# Patient Record
Sex: MALE | Race: WHITE | NOT HISPANIC OR LATINO | Employment: OTHER | URBAN - METROPOLITAN AREA
[De-identification: names, ages, dates, MRNs, and addresses within clinical notes are randomized per-mention and may not be internally consistent; named-entity substitution may affect disease eponyms.]

---

## 2019-06-26 ENCOUNTER — TELEPHONE (OUTPATIENT)
Dept: DERMATOLOGY | Facility: CLINIC | Age: 74
End: 2019-06-26

## 2019-06-26 NOTE — TELEPHONE ENCOUNTER
Pt scheduled with earlier appt tomorrow at 1 pm for rash to leg.   ----- Message from Anatoly Betancourt sent at 6/26/2019  8:11 AM CDT -----  Contact: pt   PT is requesting an earlier appointment due to rash spreading daily on his body       Pt can be reached at  [phone]

## 2019-06-27 ENCOUNTER — OFFICE VISIT (OUTPATIENT)
Dept: DERMATOLOGY | Facility: CLINIC | Age: 74
End: 2019-06-27
Payer: MEDICARE

## 2019-06-27 VITALS — BODY MASS INDEX: 23.06 KG/M2 | RESPIRATION RATE: 18 BRPM | HEIGHT: 75 IN | WEIGHT: 185.44 LBS

## 2019-06-27 DIAGNOSIS — L98.9 DISEASE OF SKIN AND SUBCUTANEOUS TISSUE: Primary | ICD-10-CM

## 2019-06-27 DIAGNOSIS — L81.4 LENTIGINES: ICD-10-CM

## 2019-06-27 DIAGNOSIS — L57.0 AK (ACTINIC KERATOSIS): ICD-10-CM

## 2019-06-27 PROCEDURE — 99202 PR OFFICE/OUTPT VISIT, NEW, LEVL II, 15-29 MIN: ICD-10-PCS | Mod: 25,S$GLB,, | Performed by: DERMATOLOGY

## 2019-06-27 PROCEDURE — 17003 DESTRUCTION, PREMALIGNANT LESIONS; SECOND THROUGH 14 LESIONS: ICD-10-PCS | Mod: S$GLB,,, | Performed by: DERMATOLOGY

## 2019-06-27 PROCEDURE — 17000 DESTRUCT PREMALG LESION: CPT | Mod: S$GLB,,, | Performed by: DERMATOLOGY

## 2019-06-27 PROCEDURE — 99999 PR PBB SHADOW E&M-EST. PATIENT-LVL III: ICD-10-PCS | Mod: PBBFAC,,, | Performed by: DERMATOLOGY

## 2019-06-27 PROCEDURE — 1101F PR PT FALLS ASSESS DOC 0-1 FALLS W/OUT INJ PAST YR: ICD-10-PCS | Mod: CPTII,S$GLB,, | Performed by: DERMATOLOGY

## 2019-06-27 PROCEDURE — 17003 DESTRUCT PREMALG LES 2-14: CPT | Mod: S$GLB,,, | Performed by: DERMATOLOGY

## 2019-06-27 PROCEDURE — 17000 PR DESTRUCTION(LASER SURGERY,CRYOSURGERY,CHEMOSURGERY),PREMALIGNANT LESIONS,FIRST LESION: ICD-10-PCS | Mod: S$GLB,,, | Performed by: DERMATOLOGY

## 2019-06-27 PROCEDURE — 99999 PR PBB SHADOW E&M-EST. PATIENT-LVL III: CPT | Mod: PBBFAC,,, | Performed by: DERMATOLOGY

## 2019-06-27 PROCEDURE — 99202 OFFICE O/P NEW SF 15 MIN: CPT | Mod: 25,S$GLB,, | Performed by: DERMATOLOGY

## 2019-06-27 PROCEDURE — 1101F PT FALLS ASSESS-DOCD LE1/YR: CPT | Mod: CPTII,S$GLB,, | Performed by: DERMATOLOGY

## 2019-06-27 RX ORDER — TRIAMCINOLONE ACETONIDE 1 MG/G
OINTMENT TOPICAL
Qty: 60 G | Refills: 3 | Status: SHIPPED | OUTPATIENT
Start: 2019-06-27 | End: 2020-01-21

## 2019-06-27 NOTE — PROGRESS NOTES
Subjective:       Patient ID:  Chico Perez is a 73 y.o. male who presents for   Chief Complaint   Patient presents with    Rash     Present for initial visit with c/o rash to L leg and R posterior forearm x 2-3 weeks. States that he has had new spots appear since first presenting on leg and will occasionally itch. Tx with OTC hydrocortisone 1% and antiinflammatory cream with minimal improvement.  Referred by PCP Dr. Hazel.    No phx of NMSC  No fhx of melanoma  History of seasonal allergies  Denies history of asthma or eczema.     Past Medical History:  No date: Allergy  No date: Arthritis  No date: BPH (benign prostatic hyperplasia)  No date: Bronchiectasis  No date: Colon polyps      Comment:  once, none on subsequent colonoscopies.  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   HENT: Negative for sore throat.    Respiratory: Negative for cough.    Gastrointestinal: Negative for nausea and vomiting.   Skin: Positive for itching and rash. Negative for dry skin, daily sunscreen use, activity-related sunscreen use and wears hat.   Hematologic/Lymphatic: Does not bruise/bleed easily.        Objective:    Physical Exam   Constitutional: He appears well-developed and well-nourished. No distress.   Neurological: He is alert and oriented to person, place, and time. He is not disoriented.   Psychiatric: He has a normal mood and affect.   Skin:   Areas Examined (abnormalities noted in diagram):   Head / Face Inspection Performed  RUE Inspected  RLE Inspected  LLE Inspection Performed                   Diagram Legend     Erythematous scaling macule/papule c/w actinic keratosis       Vascular papule c/w angioma      Pigmented verrucoid papule/plaque c/w seborrheic keratosis      Yellow umbilicated papule  c/w sebaceous hyperplasia      Irregularly shaped tan macule c/w lentigo     1-2 mm smooth white papules consistent with Milia      Movable subcutaneous cyst with punctum c/w epidermal inclusion cyst      Subcutaneous movable cyst c/w pilar cyst      Firm pink to brown papule c/w dermatofibroma      Pedunculated fleshy papule(s) c/w skin tag(s)      Evenly pigmented macule c/w junctional nevus     Mildly variegated pigmented, slightly irregular-bordered macule c/w mildly atypical nevus      Flesh colored to evenly pigmented papule c/w intradermal nevus       Pink pearly papule/plaque c/w basal cell carcinoma      Erythematous hyperkeratotic cursted plaque c/w SCC      Surgical scar with no sign of skin cancer recurrence      Open and closed comedones      Inflammatory papules and pustules      Verrucoid papule consistent consistent with wart     Erythematous eczematous patches and plaques     Dystrophic onycholytic nail with subungual debris c/w onychomycosis     Umbilicated papule    Erythematous-base heme-crusted tan verrucoid plaque consistent with inflamed seborrheic keratosis     Erythematous Silvery Scaling Plaque c/w Psoriasis     See annotation      Assessment / Plan:        Disease of skin and subcutaneous tissue- resolving eczematous patch, now with dermal hemorrhage vs eczematous PPD. Lateral lesions looks consistent with excoriation with subsequent dermal hemorrhage, lesion on ankle looks consistent with arthropod bite     - I recommended a mild soap and to avoid anti-bacterial soaps which may be too irritating.   - The patient should also use fragrance-free, color-free laundry detergents and avoid dryer sheets which can be irritating.   - The patients skin should be well-moisturized, using a recommended moisturizer multiple times a day as needed.   - I prescribed TMC 0.1% ointment to be applied twice daily to affected areas for two weeks and then tapered to weekends only.   - Side effects of topical  steroids were reviewed with the patient including skin atrophy, striae, telangectasias and tachyphylaxis.   - Discussed if lesion does not start to fade (decrease erythema) in 2 months, recommend RTC for possible punch biopsy.   - Recommend compression hose and leg elevation    AK (actinic keratosis)  Premalignant nature discussed     Cryosurgery Procedure Note    Verbal consent from the patient is obtained including, but not limited to, risk of hypopigmentation/hyperpigmentation, scar, recurrence of lesion. The patient is aware of the precancerous quality and need for treatment of these lesions. Liquid nitrogen cryosurgery is applied to the 2 actinic keratoses, as detailed in the physical exam, to produce a freeze injury. The patient is aware that blisters may form and is instructed on wound care with gentle cleansing and use of vaseline ointment to keep moist until healed. The patient is supplied a handout on cryosurgery and is instructed to call if lesions do not completely resolve.    Lentigenes  - These are benign growths. Reassurance given to patient. No treatment is necessary.   - The signs and symptoms of skin cancer were reviewed and the patient was advised to practice sun protection and sun avoidance, use daily sunscreen, and perform regular self skin exams                 Follow up if symptoms worsen or fail to improve.

## 2020-09-04 PROBLEM — A31.0 ATYPICAL MYCOBACTERIAL INFECTION OF LUNG: Status: ACTIVE | Noted: 2020-09-04

## 2021-01-15 ENCOUNTER — IMMUNIZATION (OUTPATIENT)
Dept: PRIMARY CARE CLINIC | Facility: CLINIC | Age: 76
End: 2021-01-15
Payer: MEDICARE

## 2021-01-15 DIAGNOSIS — Z23 NEED FOR VACCINATION: Primary | ICD-10-CM

## 2021-01-15 PROCEDURE — 91300 COVID-19, MRNA, LNP-S, PF, 30 MCG/0.3 ML DOSE VACCINE: ICD-10-PCS | Mod: S$GLB,,, | Performed by: FAMILY MEDICINE

## 2021-01-15 PROCEDURE — 91300 COVID-19, MRNA, LNP-S, PF, 30 MCG/0.3 ML DOSE VACCINE: CPT | Mod: S$GLB,,, | Performed by: FAMILY MEDICINE

## 2021-01-15 PROCEDURE — 0001A COVID-19, MRNA, LNP-S, PF, 30 MCG/0.3 ML DOSE VACCINE: ICD-10-PCS | Mod: S$GLB,,, | Performed by: FAMILY MEDICINE

## 2021-01-15 PROCEDURE — 0001A COVID-19, MRNA, LNP-S, PF, 30 MCG/0.3 ML DOSE VACCINE: CPT | Mod: S$GLB,,, | Performed by: FAMILY MEDICINE

## 2021-02-05 ENCOUNTER — IMMUNIZATION (OUTPATIENT)
Dept: PRIMARY CARE CLINIC | Facility: CLINIC | Age: 76
End: 2021-02-05
Payer: MEDICARE

## 2021-02-05 DIAGNOSIS — Z23 NEED FOR VACCINATION: Primary | ICD-10-CM

## 2021-02-05 PROCEDURE — 91300 COVID-19, MRNA, LNP-S, PF, 30 MCG/0.3 ML DOSE VACCINE: CPT | Mod: S$GLB,,, | Performed by: FAMILY MEDICINE

## 2021-02-05 PROCEDURE — 0002A COVID-19, MRNA, LNP-S, PF, 30 MCG/0.3 ML DOSE VACCINE: CPT | Mod: S$GLB,,, | Performed by: FAMILY MEDICINE

## 2021-02-05 PROCEDURE — 0002A COVID-19, MRNA, LNP-S, PF, 30 MCG/0.3 ML DOSE VACCINE: ICD-10-PCS | Mod: S$GLB,,, | Performed by: FAMILY MEDICINE

## 2021-02-05 PROCEDURE — 91300 COVID-19, MRNA, LNP-S, PF, 30 MCG/0.3 ML DOSE VACCINE: ICD-10-PCS | Mod: S$GLB,,, | Performed by: FAMILY MEDICINE

## 2021-12-21 PROBLEM — J44.9 CHRONIC OBSTRUCTIVE PULMONARY DISEASE, UNSPECIFIED COPD TYPE: Status: ACTIVE | Noted: 2021-12-21

## 2023-01-10 ENCOUNTER — TELEPHONE (OUTPATIENT)
Dept: ORTHOPEDICS | Facility: CLINIC | Age: 78
End: 2023-01-10
Payer: MEDICARE

## 2023-01-30 ENCOUNTER — OFFICE VISIT (OUTPATIENT)
Dept: ORTHOPEDICS | Facility: CLINIC | Age: 78
End: 2023-01-30
Payer: MEDICARE

## 2023-01-30 DIAGNOSIS — M65.341 TRIGGER FINGER, RIGHT RING FINGER: Primary | ICD-10-CM

## 2023-01-30 DIAGNOSIS — M65.341 TRIGGER RING FINGER OF RIGHT HAND: ICD-10-CM

## 2023-01-30 PROCEDURE — 99999 PR PBB SHADOW E&M-EST. PATIENT-LVL III: ICD-10-PCS | Mod: PBBFAC,,, | Performed by: ORTHOPAEDIC SURGERY

## 2023-01-30 PROCEDURE — 3288F PR FALLS RISK ASSESSMENT DOCUMENTED: ICD-10-PCS | Mod: CPTII,S$GLB,, | Performed by: ORTHOPAEDIC SURGERY

## 2023-01-30 PROCEDURE — 1159F PR MEDICATION LIST DOCUMENTED IN MEDICAL RECORD: ICD-10-PCS | Mod: CPTII,S$GLB,, | Performed by: ORTHOPAEDIC SURGERY

## 2023-01-30 PROCEDURE — 99203 PR OFFICE/OUTPT VISIT, NEW, LEVL III, 30-44 MIN: ICD-10-PCS | Mod: 25,S$GLB,, | Performed by: ORTHOPAEDIC SURGERY

## 2023-01-30 PROCEDURE — 1159F MED LIST DOCD IN RCRD: CPT | Mod: CPTII,S$GLB,, | Performed by: ORTHOPAEDIC SURGERY

## 2023-01-30 PROCEDURE — 1126F PR PAIN SEVERITY QUANTIFIED, NO PAIN PRESENT: ICD-10-PCS | Mod: CPTII,S$GLB,, | Performed by: ORTHOPAEDIC SURGERY

## 2023-01-30 PROCEDURE — 99999 PR PBB SHADOW E&M-EST. PATIENT-LVL III: CPT | Mod: PBBFAC,,, | Performed by: ORTHOPAEDIC SURGERY

## 2023-01-30 PROCEDURE — 20550 TENDON SHEATH: ICD-10-PCS | Mod: RT,S$GLB,, | Performed by: ORTHOPAEDIC SURGERY

## 2023-01-30 PROCEDURE — 1157F PR ADVANCE CARE PLAN OR EQUIV PRESENT IN MEDICAL RECORD: ICD-10-PCS | Mod: CPTII,S$GLB,, | Performed by: ORTHOPAEDIC SURGERY

## 2023-01-30 PROCEDURE — 99203 OFFICE O/P NEW LOW 30 MIN: CPT | Mod: 25,S$GLB,, | Performed by: ORTHOPAEDIC SURGERY

## 2023-01-30 PROCEDURE — 1101F PT FALLS ASSESS-DOCD LE1/YR: CPT | Mod: CPTII,S$GLB,, | Performed by: ORTHOPAEDIC SURGERY

## 2023-01-30 PROCEDURE — 20550 NJX 1 TENDON SHEATH/LIGAMENT: CPT | Mod: RT,S$GLB,, | Performed by: ORTHOPAEDIC SURGERY

## 2023-01-30 PROCEDURE — 1101F PR PT FALLS ASSESS DOC 0-1 FALLS W/OUT INJ PAST YR: ICD-10-PCS | Mod: CPTII,S$GLB,, | Performed by: ORTHOPAEDIC SURGERY

## 2023-01-30 PROCEDURE — 3288F FALL RISK ASSESSMENT DOCD: CPT | Mod: CPTII,S$GLB,, | Performed by: ORTHOPAEDIC SURGERY

## 2023-01-30 PROCEDURE — 1126F AMNT PAIN NOTED NONE PRSNT: CPT | Mod: CPTII,S$GLB,, | Performed by: ORTHOPAEDIC SURGERY

## 2023-01-30 PROCEDURE — 1157F ADVNC CARE PLAN IN RCRD: CPT | Mod: CPTII,S$GLB,, | Performed by: ORTHOPAEDIC SURGERY

## 2023-01-30 RX ORDER — TRIAMCINOLONE ACETONIDE 40 MG/ML
40 INJECTION, SUSPENSION INTRA-ARTICULAR; INTRAMUSCULAR
Status: DISCONTINUED | OUTPATIENT
Start: 2023-01-30 | End: 2023-01-30 | Stop reason: HOSPADM

## 2023-01-30 RX ADMIN — TRIAMCINOLONE ACETONIDE 40 MG: 40 INJECTION, SUSPENSION INTRA-ARTICULAR; INTRAMUSCULAR at 02:01

## 2023-01-30 NOTE — PROGRESS NOTES
2023    Chief Complaint:  Chief Complaint   Patient presents with    Right Hand - Pain     Ring finger trigger finger       HPI:  Chico Perez is a 77 y.o. male, who presents to clinic today has a history of catching and triggering of his right ring finger.  He states it has been going on for couple of months.  States that he did not have any injuries.  He has not had any treatment up to this point.  He has no other complaints.    PMHX:  Past Medical History:   Diagnosis Date    Allergy     Arthritis     BPH (benign prostatic hyperplasia)     Bronchiectasis     Colon polyps     once, none on subsequent colonoscopies.    Gout     HTN (hypertension)     Hyperlipidemia     Hypothyroid     JOE treated with BiPAP     Dr Feliciano PA (sleep medicine); Rx Ambien 5 mg 1/2 tablet qHS per Dr Zhang. titrates biPAP    Pulmonary Mycobacterium avium complex (MAC) infection     sees MAC specialist Dr Annette Eli in [facility]. last AFB 10/14/19       PSHX:  Past Surgical History:   Procedure Laterality Date    BACK SURGERY      COLONOSCOPY W/ BIOPSIES      NASAL SINUS SURGERY      PERIPHERALLY INSERTED CENTRAL CATHETER INSERTION N/A 2020    Procedure: INSERTION, PICC;  Surgeon: PICC, STPH;  Location: [facility] ENDO;  Service: Cardiology;  Laterality: N/A;  Dr Lopez       FMHX:  Family History   Problem Relation Age of Onset    Heart disease Mother     Hyperlipidemia Mother     Stroke Father     Lung cancer Sister     No Known Problems Daughter        SOCHX:  Social History     Tobacco Use    Smoking status: Former     Packs/day: 2.00     Years: 8.00     Pack years: 16.00     Types: Cigarettes     Quit date:      Years since quittin.1    Smokeless tobacco: Never   Substance Use Topics    Alcohol use: Yes     Alcohol/week: 0.0 - 2.0 standard drinks       ALLERGIES:  Patient has no known allergies.    CURRENT MEDICATIONS:  Current Outpatient Medications on File Prior to Visit   Medication Sig Dispense Refill     alfuzosin (UROXATRAL) 10 mg Tb24 Take 1 tablet (10 mg total) by mouth daily with breakfast. 90 tablet 3    amLODIPine (NORVASC) 5 MG tablet Take 1 tablet (5 mg total) by mouth once daily. 90 tablet 3    azelastine-fluticasone (DYMISTA) 137-50 mcg/spray Spry nassal spray USE 1 SPRAY IN EACH NOSTRILTWICE DAILY 69 g 3    cetirizine (ZYRTEC) 10 MG tablet Take 10 mg by mouth once daily.      donepeziL (ARICEPT) 10 MG tablet Take 1 tablet (10 mg total) by mouth every evening. 90 tablet 3    eszopiclone (LUNESTA) 3 mg Tab Take 3 mg by mouth every evening.      hydroCHLOROthiazide (MICROZIDE) 12.5 mg capsule Take 1 capsule (12.5 mg total) by mouth every morning. 90 capsule 3    levothyroxine (SYNTHROID) 75 MCG tablet Take 1 tablet (75 mcg total) by mouth once daily. 90 tablet 3    magnesium 250 mg Tab Take 1 tablet by mouth once daily.      meloxicam (MOBIC) 15 MG tablet Take 1 tablet (15 mg total) by mouth once daily. 90 tablet 0    memantine (NAMENDA) 5 MG Tab Take 1 tablet (5 mg total) by mouth once daily for 7 days, THEN 1 tablet (5 mg total) 2 (two) times daily for 7 days, THEN 2 tablets (10 mg total) 2 (two) times daily for 14 days. 77 tablet 0    multivitamin (THERAGRAN) per tablet Take 1 tablet by mouth once daily.      rosuvastatin (CRESTOR) 20 MG tablet Take 1 tablet (20 mg total) by mouth once daily. 90 tablet 3    sertraline (ZOLOFT) 100 MG tablet Take 1 tablet (100 mg total) by mouth once daily. 90 tablet 3     No current facility-administered medications on file prior to visit.       REVIEW OF SYSTEMS:  Review of Systems   Constitutional: Negative.    HENT: Negative.     Eyes: Negative.    Respiratory: Negative.     Cardiovascular: Negative.    Gastrointestinal: Negative.    Genitourinary: Negative.    Musculoskeletal:  Positive for joint pain.   Skin: Negative.    Neurological: Negative.    Endo/Heme/Allergies: Negative.    Psychiatric/Behavioral: Negative.       GENERAL PHYSICAL EXAM:   There were no vitals  taken for this visit.   GEN: well developed, well nourished, no acute distress   HENT: Normocephalic, atraumatic   EYES: No discharge, conjunctiva normal   NECK: Supple, non-tender   PULM: No wheezing, no respiratory distress   CV: RRR   ABD: Soft, non-tender    ORTHO EXAM:   Examination the right hand and ring finger reveals that there is no edema.  There are no skin changes.  Palpation produces only mild tenderness over the A1 pulley of the ring finger.  Flexion and extension reveals mild triggering.  Does have sensation intact in the median radial ulnar distribution capillary refill is less than 2 seconds in the digits     RADIOLOGY:   None    ASSESSMENT:   Right ring finger triggering    PLAN:  1. I have discussed treatment with the patient.  I have discussed the possibility of steroid injection versus continued range of motion activities.  After discussion of the risks and benefits the patient has confirmed consent for steroid injection    2.  A steroid injection was placed to the right ring finger flexor tendon sheath    3.  Will follow up with me on a p.r.n. basis

## 2023-01-30 NOTE — PROCEDURES
Tendon Sheath    Date/Time: 1/30/2023 2:00 PM  Performed by: Ruben Garcia MD  Authorized by: Ruben Garcia MD     Consent Done?:  Yes (Verbal)  Indications:  Pain  Site marked: the procedure site was marked    Timeout: prior to procedure the correct patient, procedure, and site was verified    Prep: patient was prepped and draped in usual sterile fashion      Local anesthetic:  Lidocaine 1% without epinephrine  Anesthetic total (ml):  0.5    Location:  Ring finger  Site:  R ring flexor tendon sheath  Needle size:  25 G  Medications:  40 mg triamcinolone acetonide 40 mg/mL (20mg injected)  Patient tolerance:  Patient tolerated the procedure well with no immediate complications

## 2023-08-07 PROBLEM — Z29.89 ALTITUDE SICKNESS PROPHYLAXIS: Status: ACTIVE | Noted: 2023-08-07

## 2023-09-08 ENCOUNTER — OFFICE VISIT (OUTPATIENT)
Dept: PAIN MEDICINE | Facility: CLINIC | Age: 78
End: 2023-09-08
Payer: MEDICARE

## 2023-09-08 VITALS
SYSTOLIC BLOOD PRESSURE: 174 MMHG | HEART RATE: 53 BPM | BODY MASS INDEX: 24.74 KG/M2 | WEIGHT: 198 LBS | DIASTOLIC BLOOD PRESSURE: 84 MMHG

## 2023-09-08 DIAGNOSIS — G89.29 CHRONIC BILATERAL LOW BACK PAIN WITHOUT SCIATICA: ICD-10-CM

## 2023-09-08 DIAGNOSIS — M54.50 CHRONIC BILATERAL LOW BACK PAIN WITHOUT SCIATICA: ICD-10-CM

## 2023-09-08 DIAGNOSIS — M54.16 LUMBAR RADICULOPATHY, CHRONIC: Primary | ICD-10-CM

## 2023-09-08 DIAGNOSIS — M53.3 SACRAL BACK PAIN: ICD-10-CM

## 2023-09-08 PROCEDURE — 1159F MED LIST DOCD IN RCRD: CPT | Mod: CPTII,S$GLB,, | Performed by: ANESTHESIOLOGY

## 2023-09-08 PROCEDURE — 99999 PR PBB SHADOW E&M-EST. PATIENT-LVL III: ICD-10-PCS | Mod: PBBFAC,,, | Performed by: ANESTHESIOLOGY

## 2023-09-08 PROCEDURE — 3077F SYST BP >= 140 MM HG: CPT | Mod: CPTII,S$GLB,, | Performed by: ANESTHESIOLOGY

## 2023-09-08 PROCEDURE — 1125F AMNT PAIN NOTED PAIN PRSNT: CPT | Mod: CPTII,S$GLB,, | Performed by: ANESTHESIOLOGY

## 2023-09-08 PROCEDURE — 1157F PR ADVANCE CARE PLAN OR EQUIV PRESENT IN MEDICAL RECORD: ICD-10-PCS | Mod: CPTII,S$GLB,, | Performed by: ANESTHESIOLOGY

## 2023-09-08 PROCEDURE — 1159F PR MEDICATION LIST DOCUMENTED IN MEDICAL RECORD: ICD-10-PCS | Mod: CPTII,S$GLB,, | Performed by: ANESTHESIOLOGY

## 2023-09-08 PROCEDURE — 3288F PR FALLS RISK ASSESSMENT DOCUMENTED: ICD-10-PCS | Mod: CPTII,S$GLB,, | Performed by: ANESTHESIOLOGY

## 2023-09-08 PROCEDURE — 3079F DIAST BP 80-89 MM HG: CPT | Mod: CPTII,S$GLB,, | Performed by: ANESTHESIOLOGY

## 2023-09-08 PROCEDURE — 3077F PR MOST RECENT SYSTOLIC BLOOD PRESSURE >= 140 MM HG: ICD-10-PCS | Mod: CPTII,S$GLB,, | Performed by: ANESTHESIOLOGY

## 2023-09-08 PROCEDURE — 99204 OFFICE O/P NEW MOD 45 MIN: CPT | Mod: S$GLB,,, | Performed by: ANESTHESIOLOGY

## 2023-09-08 PROCEDURE — 99204 PR OFFICE/OUTPT VISIT, NEW, LEVL IV, 45-59 MIN: ICD-10-PCS | Mod: S$GLB,,, | Performed by: ANESTHESIOLOGY

## 2023-09-08 PROCEDURE — 1157F ADVNC CARE PLAN IN RCRD: CPT | Mod: CPTII,S$GLB,, | Performed by: ANESTHESIOLOGY

## 2023-09-08 PROCEDURE — 1101F PT FALLS ASSESS-DOCD LE1/YR: CPT | Mod: CPTII,S$GLB,, | Performed by: ANESTHESIOLOGY

## 2023-09-08 PROCEDURE — 3079F PR MOST RECENT DIASTOLIC BLOOD PRESSURE 80-89 MM HG: ICD-10-PCS | Mod: CPTII,S$GLB,, | Performed by: ANESTHESIOLOGY

## 2023-09-08 PROCEDURE — 1125F PR PAIN SEVERITY QUANTIFIED, PAIN PRESENT: ICD-10-PCS | Mod: CPTII,S$GLB,, | Performed by: ANESTHESIOLOGY

## 2023-09-08 PROCEDURE — 1101F PR PT FALLS ASSESS DOC 0-1 FALLS W/OUT INJ PAST YR: ICD-10-PCS | Mod: CPTII,S$GLB,, | Performed by: ANESTHESIOLOGY

## 2023-09-08 PROCEDURE — 1160F PR REVIEW ALL MEDS BY PRESCRIBER/CLIN PHARMACIST DOCUMENTED: ICD-10-PCS | Mod: CPTII,S$GLB,, | Performed by: ANESTHESIOLOGY

## 2023-09-08 PROCEDURE — 1160F RVW MEDS BY RX/DR IN RCRD: CPT | Mod: CPTII,S$GLB,, | Performed by: ANESTHESIOLOGY

## 2023-09-08 PROCEDURE — 3288F FALL RISK ASSESSMENT DOCD: CPT | Mod: CPTII,S$GLB,, | Performed by: ANESTHESIOLOGY

## 2023-09-08 PROCEDURE — 99999 PR PBB SHADOW E&M-EST. PATIENT-LVL III: CPT | Mod: PBBFAC,,, | Performed by: ANESTHESIOLOGY

## 2023-09-08 NOTE — PROGRESS NOTES
"  This note was completed with dictation software and grammatical errors may exist.    Chief Complaint   Patient presents with    Low-back Pain    Lumbar Spine Pain (L-Spine)        HPI: Chico Perez is a 78 y.o. year old male patient who has a past medical history of Allergy, Arthritis, BPH (benign prostatic hyperplasia), Bronchiectasis, Colon polyps, Gout, HTN (hypertension), Hyperlipidemia, Hypothyroid, JOE treated with BiPAP, and Pulmonary Mycobacterium avium complex (MAC) infection. He presents in referral from Dr. Maria C Hazel for back pain.  The patient reports having history of multiple back surgeries with pain radiating in his hips, weakness and has done well with each surgery.  His last surgery was in 2018 and had been doing well until about 6 months ago.  He states that he has been having pain in the sacrum but not in the coccyx.  He states that is worse with sitting in hard chairs, stooping bending forward.  He describes the pain as dull.  Is worse with long sitting, it does improve once he stands up and starts walking around.      Pain intervention history: None    Spine surgeries:  Lumbar surgery 1991, 2005, 2018    Antineuropathics:  NSAIDs:  Mobic 15  Physical therapy:  Antidepressants:  Zoloft 100  Muscle relaxers:  Zanaflex 4  Opioids:  Antiplatelets/Anticoagulants:        ROS:  He reports back pain, memory loss.  Balance of review of systems is negative.    No results found for: "LABA1C", "HGBA1C"    Lab Results   Component Value Date    WBC 6.59 07/19/2023    HGB 14.6 07/19/2023    HCT 43.8 07/19/2023    MCV 97 07/19/2023     07/19/2023             Past Medical History:   Diagnosis Date    Allergy     Arthritis     BPH (benign prostatic hyperplasia)     Bronchiectasis     Colon polyps     once, none on subsequent colonoscopies.    Gout     HTN (hypertension)     Hyperlipidemia     Hypothyroid     JOE treated with BiPAP     Dr Feliciano PA (sleep medicine); Rx Ambien 5 mg 1/2 tablet qHS " per Dr Zhang. titrates biPAP    Pulmonary Mycobacterium avium complex (MAC) infection     sees MAC specialist Dr Annette Eli in [facility]. last AFB 10/14/19       Past Surgical History:   Procedure Laterality Date    BACK SURGERY  2017    COLONOSCOPY W/ BIOPSIES      NASAL SINUS SURGERY      PERIPHERALLY INSERTED CENTRAL CATHETER INSERTION N/A 2020    Procedure: INSERTION, PICC;  Surgeon: PICC, STPH;  Location: [facility] ENDO;  Service: Cardiology;  Laterality: N/A;  Dr Lopez       Social History     Socioeconomic History    Marital status:    Occupational History    Occupation: retired, used to work for [employer];    Tobacco Use    Smoking status: Former     Current packs/day: 0.00     Average packs/day: 2.0 packs/day for 8.0 years (16.0 ttl pk-yrs)     Types: Cigarettes     Start date:      Quit date:      Years since quittin.7    Smokeless tobacco: Never   Substance and Sexual Activity    Alcohol use: Yes     Alcohol/week: 0.0 - 2.0 standard drinks of alcohol    Drug use: Never    Sexual activity: Yes     Partners: Female     Social Determinants of Health     Financial Resource Strain: Low Risk  (2022)    Overall Financial Resource Strain (CARDIA)     Difficulty of Paying Living Expenses: Not hard at all   Food Insecurity: No Food Insecurity (2022)    Hunger Vital Sign     Worried About Running Out of Food in the Last Year: Never true     Ran Out of Food in the Last Year: Never true   Transportation Needs: No Transportation Needs (2022)    PRAPARE - Transportation     Lack of Transportation (Medical): No     Lack of Transportation (Non-Medical): No   Physical Activity: Insufficiently Active (2022)    Exercise Vital Sign     Days of Exercise per Week: 3 days     Minutes of Exercise per Session: 30 min   Stress: No Stress Concern Present (2022)    [employer] [place] [employer] - Occupational Stress Questionnaire     Feeling of Stress : Not at  all   Social Connections: Unknown (4/19/2022)    Social Connection and Isolation Panel [NHANES]     Frequency of Communication with Friends and Family: Once a week     Frequency of Social Gatherings with Friends and Family: Patient refused     Active Member of Clubs or Organizations: No     Attends Club or Organization Meetings: Never     Marital Status:    Housing Stability: Low Risk  (4/19/2022)    Housing Stability Vital Sign     Unable to Pay for Housing in the Last Year: No     Number of Places Lived in the Last Year: 1     Unstable Housing in the Last Year: No         Medications/Allergies: See med card    Vitals:    09/08/23 0954   BP: (!) 174/84   Pulse: (!) 53   Weight: 89.8 kg (197 lb 15.6 oz)   PainSc:   2   PainLoc: Back     Body mass index is 24.74 kg/m².    Physical exam:  Gen: A and O x3, pleasant, well-groomed  Skin: No rashes or obvious lesions  HEENT: PERRLA, no obvious deformities on ears or in canals. Trachea midline.  CVS: Regular rate and rhythm, normal palpable pulses.  Resp:No increased work of breathing, symmetrical chest rise.  Abdomen: Soft, NT/ND.  Musculoskeletal: Able to heel walk, toe walk. No antalgic gait.           Neuro:    Iliopsoas Quadriceps Knee  Flexion Tibialis  anterior Gastro- cnemius EHL   Lower: R 5/5 5/5 5/5 5/5 5/5 5/5    L 5/5 5/5 5/5 5/5 5/5 5/5      Left  Right    Patellar DTR 1+ 1+   Achilles DTR 0+ 0+   Barnes Absent  Absent   Clonus Absent Absent   Babinski Absent Absent     Intact and symmetrical to light touch and pinprick in L1-S1 dermatomes bilaterally.    Lumbar spine:  Lumbar spine: ROM is full with flexion extension and oblique extension with slight increased pain on extension.  Gordon's test causes no increased pain on either side.    Supine straight leg raise is negative bilaterally.    Internal and external rotation of the hip causes no increased pain on either side.  Myofascial exam: No tenderness to palpation across lumbar paraspinous muscles.   No tenderness with palpation over the sacrum or the coccyx.    Imagin23 Xray L-spine:  There is normal lumbar lordosis.  Multilevel degenerative disc disease throughout the lumbar spine with disc space narrowing especially at the L3-4 L4-5 and L5-S1 disc spaces.  There is no spondylolisthesis or spondylolysis.  Marginal anterior spondylotic osteophytes throughout the lumbar spine.  Vertebral body heights are maintained.  No acute fractures or osteoblastic or lytic lesions.  The spinous process of L3 not visualized, likely related to prior decompressive laminectomies.  Clinical correlation for this is suggested.  Bilateral SI joint arthropathy.  Paraspinal soft tissues are unremarkable.   Calcific atherosclerosis of the abdominal aorta and the iliac arteries.    Assessment:  Chico Perez is a 78 y.o. year old male patient who has a past medical history of Allergy, Arthritis, BPH (benign prostatic hyperplasia), Bronchiectasis, Colon polyps, Gout, HTN (hypertension), Hyperlipidemia, Hypothyroid, JOE treated with BiPAP, and Pulmonary Mycobacterium avium complex (MAC) infection. He presents in referral from Dr. Maria C Hazel for back pain.   1. Lumbar radiculopathy, chronic  MRI Lumbar Spine W WO Cont    Creatinine, serum      2. Sacral back pain  Ambulatory referral/consult to Pain Clinic    MRI Lumbar Spine W WO Cont    Creatinine, serum      3. Chronic bilateral low back pain without sciatica  Ambulatory referral/consult to Pain Clinic    MRI Lumbar Spine W WO Cont    Creatinine, serum          Plan:  1. We discussed that it is unclear what is causing his pain, does not seem to be coccydynia, we do not have any imaging other than x-ray.  I do suspect that he may have vertebrogenic pain or stenosis.  I am going to get an MRI of his lumbar spine with and without contrast and call him with the results and we can discuss management.      Thank you for referring this interesting patient, and I look forward to  continuing to collaborate in his care.

## 2023-09-22 ENCOUNTER — HOSPITAL ENCOUNTER (OUTPATIENT)
Dept: RADIOLOGY | Facility: HOSPITAL | Age: 78
Discharge: HOME | End: 2023-09-22
Attending: ANESTHESIOLOGY
Payer: MEDICARE

## 2023-09-22 DIAGNOSIS — M53.3 SACRAL BACK PAIN: ICD-10-CM

## 2023-09-22 DIAGNOSIS — M54.16 LUMBAR RADICULOPATHY, CHRONIC: ICD-10-CM

## 2023-09-22 DIAGNOSIS — G89.29 CHRONIC BILATERAL LOW BACK PAIN WITHOUT SCIATICA: ICD-10-CM

## 2023-09-22 DIAGNOSIS — M54.50 CHRONIC BILATERAL LOW BACK PAIN WITHOUT SCIATICA: ICD-10-CM

## 2023-09-22 PROCEDURE — 72158 MRI LUMBAR SPINE W/O & W/DYE: CPT | Mod: 26,,, | Performed by: RADIOLOGY

## 2023-09-22 PROCEDURE — A9585 GADOBUTROL INJECTION: HCPCS | Mod: PO | Performed by: ANESTHESIOLOGY

## 2023-09-22 PROCEDURE — 72158 MRI LUMBAR SPINE W/O & W/DYE: CPT | Mod: TC,PO

## 2023-09-22 PROCEDURE — 25500020 PHARM REV CODE 255: Mod: PO | Performed by: ANESTHESIOLOGY

## 2023-09-22 PROCEDURE — 72158 MRI LUMBAR SPINE W WO CONTRAST: ICD-10-PCS | Mod: 26,,, | Performed by: RADIOLOGY

## 2023-09-22 RX ORDER — GADOBUTROL 604.72 MG/ML
8 INJECTION INTRAVENOUS
Status: COMPLETED | OUTPATIENT
Start: 2023-09-22 | End: 2023-09-22

## 2023-09-22 RX ADMIN — GADOBUTROL 8 ML: 604.72 INJECTION INTRAVENOUS at 09:09

## 2023-09-27 ENCOUNTER — TELEPHONE (OUTPATIENT)
Dept: PAIN MEDICINE | Facility: HOSPITAL | Age: 78
End: 2023-09-27

## 2023-09-27 NOTE — TELEPHONE ENCOUNTER
Please let the patient know that I have reviewed his MRI and it mainly shows arthritis, no narrowing of the spinal canal or pinching of the nerves.  I recommend that we could try a procedure that would numb the joint in his low back to tell us if the pain is coming from those joints.  I have placed the orders below.      Physician - Dr Maynard    Type of Procedure/Injection - Lumbar Medial Branch Block  L4/5 and L5/S1           Laterality - Bilateral      Type of Sedation - RNIV      Need to hold medication - No      N/A          Clearance needed - No      Follow up - phone call next day

## 2023-09-28 ENCOUNTER — PATIENT MESSAGE (OUTPATIENT)
Dept: PAIN MEDICINE | Facility: CLINIC | Age: 78
End: 2023-09-28
Payer: MEDICARE

## 2023-09-28 DIAGNOSIS — M47.816 LUMBAR SPONDYLOSIS: Primary | ICD-10-CM

## 2023-09-28 RX ORDER — SODIUM CHLORIDE, SODIUM LACTATE, POTASSIUM CHLORIDE, CALCIUM CHLORIDE 600; 310; 30; 20 MG/100ML; MG/100ML; MG/100ML; MG/100ML
INJECTION, SOLUTION INTRAVENOUS CONTINUOUS
Status: CANCELLED | OUTPATIENT
Start: 2023-09-28

## 2023-09-28 NOTE — TELEPHONE ENCOUNTER
Spoke with patient. Scheduled for lumbar MBB with Dr. Maynard. Laurynt message sent with pre op information.

## 2023-10-13 ENCOUNTER — HOSPITAL ENCOUNTER (OUTPATIENT)
Facility: HOSPITAL | Age: 78
Discharge: HOME | End: 2023-10-13
Attending: ANESTHESIOLOGY | Admitting: ANESTHESIOLOGY
Payer: MEDICARE

## 2023-10-13 ENCOUNTER — HOSPITAL ENCOUNTER (OUTPATIENT)
Dept: RADIOLOGY | Facility: HOSPITAL | Age: 78
Discharge: HOME | End: 2023-10-13
Attending: ANESTHESIOLOGY | Admitting: ANESTHESIOLOGY
Payer: MEDICARE

## 2023-10-13 VITALS
DIASTOLIC BLOOD PRESSURE: 88 MMHG | WEIGHT: 200 LBS | TEMPERATURE: 98 F | OXYGEN SATURATION: 98 % | HEIGHT: 75 IN | RESPIRATION RATE: 14 BRPM | BODY MASS INDEX: 24.87 KG/M2 | HEART RATE: 60 BPM | SYSTOLIC BLOOD PRESSURE: 184 MMHG

## 2023-10-13 DIAGNOSIS — M47.816 LUMBAR SPONDYLOSIS: ICD-10-CM

## 2023-10-13 DIAGNOSIS — M54.50 LOWER BACK PAIN: ICD-10-CM

## 2023-10-13 PROCEDURE — 64493 PR INJ DX/THER AGNT PARAVERT FACET JOINT,IMG GUIDE,LUMBAR/SAC,1ST LVL: ICD-10-PCS | Mod: 50,,, | Performed by: ANESTHESIOLOGY

## 2023-10-13 PROCEDURE — 76000 FLUOROSCOPY <1 HR PHYS/QHP: CPT | Mod: TC,PO

## 2023-10-13 PROCEDURE — 64494 PR INJ DX/THER AGNT PARAVERT FACET JOINT,IMG GUIDE,LUMBAR/SAC, 2ND LEVEL: ICD-10-PCS | Mod: 50,,, | Performed by: ANESTHESIOLOGY

## 2023-10-13 PROCEDURE — 25000003 PHARM REV CODE 250: Mod: PO | Performed by: ANESTHESIOLOGY

## 2023-10-13 PROCEDURE — 64493 INJ PARAVERT F JNT L/S 1 LEV: CPT | Mod: 50,,, | Performed by: ANESTHESIOLOGY

## 2023-10-13 PROCEDURE — 64494 INJ PARAVERT F JNT L/S 2 LEV: CPT | Mod: 50,,, | Performed by: ANESTHESIOLOGY

## 2023-10-13 PROCEDURE — 64493 INJ PARAVERT F JNT L/S 1 LEV: CPT | Mod: 50,PO | Performed by: ANESTHESIOLOGY

## 2023-10-13 PROCEDURE — 64494 INJ PARAVERT F JNT L/S 2 LEV: CPT | Mod: 50,PO | Performed by: ANESTHESIOLOGY

## 2023-10-13 PROCEDURE — 63600175 PHARM REV CODE 636 W HCPCS: Mod: PO | Performed by: ANESTHESIOLOGY

## 2023-10-13 RX ORDER — SODIUM CHLORIDE, SODIUM LACTATE, POTASSIUM CHLORIDE, CALCIUM CHLORIDE 600; 310; 30; 20 MG/100ML; MG/100ML; MG/100ML; MG/100ML
INJECTION, SOLUTION INTRAVENOUS CONTINUOUS
Status: DISCONTINUED | OUTPATIENT
Start: 2023-10-13 | End: 2023-10-13 | Stop reason: HOSPADM

## 2023-10-13 RX ORDER — LIDOCAINE HYDROCHLORIDE 10 MG/ML
INJECTION, SOLUTION EPIDURAL; INFILTRATION; INTRACAUDAL; PERINEURAL
Status: DISCONTINUED | OUTPATIENT
Start: 2023-10-13 | End: 2023-10-13 | Stop reason: HOSPADM

## 2023-10-13 RX ORDER — MIDAZOLAM HYDROCHLORIDE 2 MG/2ML
INJECTION, SOLUTION INTRAMUSCULAR; INTRAVENOUS
Status: DISCONTINUED | OUTPATIENT
Start: 2023-10-13 | End: 2023-10-13 | Stop reason: HOSPADM

## 2023-10-13 RX ORDER — BUPIVACAINE HYDROCHLORIDE 5 MG/ML
INJECTION, SOLUTION EPIDURAL; INTRACAUDAL
Status: DISCONTINUED | OUTPATIENT
Start: 2023-10-13 | End: 2023-10-13 | Stop reason: HOSPADM

## 2023-10-13 RX ADMIN — SODIUM CHLORIDE, POTASSIUM CHLORIDE, SODIUM LACTATE AND CALCIUM CHLORIDE: 600; 310; 30; 20 INJECTION, SOLUTION INTRAVENOUS at 12:10

## 2023-10-13 NOTE — H&P
CC: Back pain    HPI: The patient is a 79yo man with a history of lumbar spondylosis here for bilateral L4/5 and L5/S1 medial branch block. There are no major changes in history and physical from 23.    Past Medical History:   Diagnosis Date    Allergy     Arthritis     BPH (benign prostatic hyperplasia)     Bronchiectasis     Colon polyps     once, none on subsequent colonoscopies.    Gout     HTN (hypertension)     Hyperlipidemia     Hypothyroid     JOE treated with BiPAP     Dr Feliciano PA (sleep medicine); Rx Ambien 5 mg 1/2 tablet qHS per Dr Zhang. titrates biPAP    Pulmonary Mycobacterium avium complex (MAC) infection     sees MAC specialist Dr Annette Eli in [facility]. last AFB 10/14/19       Past Surgical History:   Procedure Laterality Date    BACK SURGERY  2017    COLONOSCOPY W/ BIOPSIES      NASAL SINUS SURGERY      PERIPHERALLY INSERTED CENTRAL CATHETER INSERTION N/A 2020    Procedure: INSERTION, PICC;  Surgeon: PICC, STPH;  Location: [facility] ENDO;  Service: Cardiology;  Laterality: N/A;  Dr Lopez       Family History   Problem Relation Age of Onset    Heart disease Mother     Hyperlipidemia Mother     Stroke Father     Lung cancer Sister     No Known Problems Daughter        Social History     Socioeconomic History    Marital status:    Occupational History    Occupation: retired, used to work for [employer];    Tobacco Use    Smoking status: Former     Current packs/day: 0.00     Average packs/day: 2.0 packs/day for 8.0 years (16.0 ttl pk-yrs)     Types: Cigarettes     Start date:      Quit date:      Years since quittin.8    Smokeless tobacco: Never   Substance and Sexual Activity    Alcohol use: Yes     Alcohol/week: 0.0 - 2.0 standard drinks of alcohol    Drug use: Never    Sexual activity: Yes     Partners: Female     Social Determinants of Health     Financial Resource Strain: Low Risk  (2022)    Overall Financial Resource Strain (CARDIA)      "Difficulty of Paying Living Expenses: Not hard at all   Food Insecurity: No Food Insecurity (4/19/2022)    Hunger Vital Sign     Worried About Running Out of Food in the Last Year: Never true     Ran Out of Food in the Last Year: Never true   Transportation Needs: No Transportation Needs (4/19/2022)    PRAPARE - Transportation     Lack of Transportation (Medical): No     Lack of Transportation (Non-Medical): No   Physical Activity: Insufficiently Active (4/19/2022)    Exercise Vital Sign     Days of Exercise per Week: 3 days     Minutes of Exercise per Session: 30 min   Stress: No Stress Concern Present (4/19/2022)    [employer] [employer] - Occupational Stress Questionnaire     Feeling of Stress : Not at all   Social Connections: Unknown (4/19/2022)    Social Connection and Isolation Panel [NHANES]     Frequency of Communication with Friends and Family: Once a week     Frequency of Social Gatherings with Friends and Family: Patient refused     Active Member of Clubs or Organizations: No     Attends Club or Organization Meetings: Never     Marital Status:    Housing Stability: Low Risk  (4/19/2022)    Housing Stability Vital Sign     Unable to Pay for Housing in the Last Year: No     Number of Places Lived in the Last Year: 1     Unstable Housing in the Last Year: No       Current Facility-Administered Medications   Medication Dose Route Frequency Provider Last Rate Last Admin    lactated ringers infusion   Intravenous Continuous Martin Maynard MD 25 mL/hr at 10/13/23 1227 New Bag at 10/13/23 1227       Review of patient's allergies indicates:  No Known Allergies    Vitals:    10/09/23 1304 10/13/23 1220   BP:  (!) 176/87   Pulse:  (!) 54   Resp:  14   Temp:  97 °F (36.1 °C)   SpO2:  98%   Weight: 90.7 kg (200 lb)    Height: 6' 3" (1.905 m)        ASA 2, Mallampati 2    REVIEW OF SYSTEMS:     GENERAL: No weight loss, malaise or fevers.  HEENT:  No recent changes in vision or " hearing  NECK: Negative for lumps, no difficulty with swallowing.  RESPIRATORY: Negative for cough, wheezing or shortness of breath, patient denies any recent URI.  CARDIOVASCULAR: Negative for chest pain, leg swelling or palpitations.  GI: Negative for abdominal discomfort, blood in stools or black stools or change in bowel habits.  MUSCULOSKELETAL: See HPI.  SKIN: Negative for lesions, rash, and itching.  PSYCH: No suicidal or homicidal ideations, no current mood disturbances.  HEMATOLOGY/LYMPHOLOGY: Negative for prolonged bleeding, bruising easily or swollen nodes. Patient is not currently taking any anti-coagulants  ENDO: No history of diabetes or thyroid dysfunction  NEURO: No history of syncope, paralysis, seizures or tremors.All other reviewed and negative other than HPI.    Physical exam:  Gen: A and O x3, pleasant, well-groomed  Skin: No rashes or obvious lesions  HEENT: PERRLA, no obvious deformities on ears or in canals. No thyroid masses, trachea midline, no palpable lymph nodes in neck, axilla.  CVS: Regular rate and rhythm, normal S1 and S2, no murmurs.  Resp: Clear to auscultation bilaterally.  Abdomen: Soft, NT/ND, normal bowel sounds present.  Musculoskeletal/Neuro: Moving all extremities    Assessment:  Lumbar spondylosis  -     Case Request Operating Room: Block-nerve-medial branch-lumbar     L4/5, L5/S1  -     Place in Outpatient; Standing  -     Vital signs; Standing  -     Place 18-22 gauage peripheral IV ; Standing  -     Verify informed consent; Standing  -     Notify physician ; Standing  -     Notify physician ; Standing  -     Notify physician (specify); Standing  -     Diet NPO; Standing  -     lactated ringers infusion    Other orders  -     IP VTE LOW RISK PATIENT; Standing

## 2023-10-13 NOTE — DISCHARGE SUMMARY
[facility] - Surgery  Discharge Note  Short Stay    Procedure(s) (LRB):  Block-nerve-medial branch-lumbar     L4/5, L5/S1 (Bilateral)      OUTCOME: Patient tolerated treatment/procedure well without complication and is now ready for discharge.    DISPOSITION: Home or Self Care    FINAL DIAGNOSIS:  Lumbar spondylosis    FOLLOWUP: In clinic    DISCHARGE INSTRUCTIONS:    Discharge Procedure Orders   Diet Adult Regular     No dressing needed     Notify your health care provider if you experience any of the following:  temperature >100.4     Activity as tolerated

## 2023-10-13 NOTE — OP NOTE
PROCEDURE DATE: 10/13/2023    PROCEDURE:  Diagnostic bilateral L4/5 and L5/S1 medial branch nerve block     DIAGNOSIS:  Lumbar spondylosis    Post Op diagnosis: Same    PHYSICIAN: Martin Maynard MD    MEDICATIONS INJECTED: 0.25% bupivicaine, 1ml at each level    LOCAL ANESTHETIC USED: Lidocaine 1%, 2ml at each level    SEDATION MEDICATIONS:2mg versed    ESTIMATED BLOOD LOSS:  none    COMPLICATIONS:  none    TECHNIQUE: A time out was taken to identify the patient, procedure and side of the procedure. The patient was placed in a prone position, then prepped and draped in the usual sterile fashion using ChloraPrep and sterile towels.  The levels were determined under fluoroscopic guidance and then marked.  Local anesthetic was given by raising a wheal at the skin over each site and then infiltrated approximately 2cm deeper.  A 25-gauge 3.5 inch needle was introduced to the anatomic location of the right and then left L4/5 and L5/S1 medial branch nerves on the bilateral side.  The above medication was then injected. The patient tolerated the procedure well.     The patient was monitored after the procedure. The patient will be contacted in the next few days to determine extent of relief.  Patient was given post procedure and discharge instructions to follow at home.  The patient was discharged in a stable condition.,

## 2023-10-26 ENCOUNTER — TELEPHONE (OUTPATIENT)
Dept: PAIN MEDICINE | Facility: CLINIC | Age: 78
End: 2023-10-26

## 2024-06-07 ENCOUNTER — OFFICE VISIT (OUTPATIENT)
Dept: URGENT CARE | Facility: CLINIC | Age: 79
End: 2024-06-07
Payer: MEDICARE

## 2024-06-07 VITALS
HEART RATE: 68 BPM | BODY MASS INDEX: 25.49 KG/M2 | SYSTOLIC BLOOD PRESSURE: 138 MMHG | HEIGHT: 75 IN | OXYGEN SATURATION: 99 % | TEMPERATURE: 98 F | WEIGHT: 205 LBS | DIASTOLIC BLOOD PRESSURE: 69 MMHG | RESPIRATION RATE: 17 BRPM

## 2024-06-07 DIAGNOSIS — J06.9 UPPER RESPIRATORY TRACT INFECTION, UNSPECIFIED TYPE: Primary | ICD-10-CM

## 2024-06-07 DIAGNOSIS — R53.83 FATIGUE, UNSPECIFIED TYPE: ICD-10-CM

## 2024-06-07 DIAGNOSIS — R05.9 COUGH, UNSPECIFIED TYPE: ICD-10-CM

## 2024-06-07 DIAGNOSIS — Z87.01 HISTORY OF PNEUMONIA: ICD-10-CM

## 2024-06-07 LAB
CTP QC/QA: YES
SARS-COV-2 AG RESP QL IA.RAPID: NEGATIVE

## 2024-06-07 PROCEDURE — 71046 X-RAY EXAM CHEST 2 VIEWS: CPT | Mod: S$GLB,,, | Performed by: RADIOLOGY

## 2024-06-07 PROCEDURE — 99203 OFFICE O/P NEW LOW 30 MIN: CPT | Mod: S$GLB,,, | Performed by: PHYSICIAN ASSISTANT

## 2024-06-07 PROCEDURE — 87811 SARS-COV-2 COVID19 W/OPTIC: CPT | Mod: QW,S$GLB,, | Performed by: PHYSICIAN ASSISTANT

## 2024-06-07 RX ORDER — PREDNISONE 20 MG/1
20 TABLET ORAL DAILY
Qty: 3 TABLET | Refills: 0 | Status: SHIPPED | OUTPATIENT
Start: 2024-06-07 | End: 2024-06-10

## 2024-06-07 RX ORDER — AZELASTINE 1 MG/ML
1 SPRAY, METERED NASAL 2 TIMES DAILY
Qty: 30 ML | Refills: 0 | Status: SHIPPED | OUTPATIENT
Start: 2024-06-07 | End: 2025-06-07

## 2024-06-07 RX ORDER — PROMETHAZINE HYDROCHLORIDE 6.25 MG/5ML
SYRUP ORAL
Qty: 120 ML | Refills: 1 | Status: SHIPPED | OUTPATIENT
Start: 2024-06-07

## 2024-06-07 NOTE — PATIENT INSTRUCTIONS

## 2024-06-07 NOTE — PROGRESS NOTES
"Subjective:      Patient ID: Chico Perez is a 78 y.o. male.    Vitals:  height is 6' 3" (1.905 m) and weight is 93 kg (205 lb). His oral temperature is 97.5 °F (36.4 °C). His blood pressure is 138/69 and his pulse is 68. His respiration is 17 and oxygen saturation is 99%.     Chief Complaint: Fatigue    Pt presents to  fatigue, pt has only been taking regular medications. Onset 3 days ago. Chronic sinus congestion. Pt has history of pulmonary MAC. Denies fever, sob or cp. States he feels exhausted and only been going on for 4 days. Sister saji chance has similar symptoms and diagnosed with a sinus infection.    Fatigue  This is a new problem. The current episode started in the past 7 days. The problem occurs daily. The problem has been unchanged. Associated symptoms include congestion, coughing and fatigue. Pertinent negatives include no abdominal pain, arthralgias, chest pain, chills, diaphoresis, fever, headaches, joint swelling, myalgias, nausea, neck pain, numbness, rash, sore throat or vomiting. Nothing aggravates the symptoms. He has tried nothing for the symptoms. The treatment provided no relief.       Constitution: Positive for fatigue. Negative for chills, sweating and fever.   HENT:  Positive for congestion and postnasal drip. Negative for ear pain, drooling, sore throat, trouble swallowing and voice change.    Neck: Negative for neck pain, neck stiffness and painful lymph nodes.   Cardiovascular:  Negative for chest pain, leg swelling, palpitations, sob on exertion and passing out.   Eyes:  Negative for eye discharge, eye itching, eye pain, eye redness and eyelid swelling.   Respiratory:  Positive for cough. Negative for chest tightness, sputum production, bloody sputum, shortness of breath, stridor and wheezing.    Gastrointestinal:  Negative for abdominal pain, abdominal bloating, nausea, vomiting, constipation, diarrhea and heartburn.   Genitourinary:  Negative for urine decreased.   Musculoskeletal:  " Negative for joint pain, joint swelling, abnormal ROM of joint, pain with walking, muscle cramps and muscle ache.   Skin:  Negative for rash and hives.   Allergic/Immunologic: Negative for hives, itching and sneezing.   Neurological:  Negative for dizziness, light-headedness, passing out, loss of balance, headaches, altered mental status, loss of consciousness, numbness and seizures.   Hematologic/Lymphatic: Negative for swollen lymph nodes.   Psychiatric/Behavioral:  Negative for altered mental status and nervous/anxious. The patient is not nervous/anxious.       Objective:     Physical Exam   Constitutional: He is oriented to person, place, and time. He appears well-developed. He is cooperative.  Non-toxic appearance. He does not appear ill. No distress.   HENT:   Head: Normocephalic and atraumatic.   Ears:   Right Ear: Hearing, tympanic membrane, external ear and ear canal normal.   Left Ear: Hearing, tympanic membrane, external ear and ear canal normal.   Nose: Mucosal edema and rhinorrhea present. No nasal deformity. No epistaxis. Right sinus exhibits no maxillary sinus tenderness and no frontal sinus tenderness. Left sinus exhibits no maxillary sinus tenderness and no frontal sinus tenderness.   Mouth/Throat: Uvula is midline and mucous membranes are normal. No trismus in the jaw. Normal dentition. No uvula swelling. Posterior oropharyngeal erythema and cobblestoning present. No oropharyngeal exudate, posterior oropharyngeal edema or tonsillar abscesses. No tonsillar exudate.   Eyes: Conjunctivae and lids are normal. No scleral icterus.   Neck: Trachea normal and phonation normal. Neck supple. No edema present. No erythema present. No neck rigidity present.   Cardiovascular: Normal rate, regular rhythm, normal heart sounds and normal pulses.   Pulmonary/Chest: Effort normal and breath sounds normal. No accessory muscle usage or stridor. No respiratory distress. He has no decreased breath sounds. He has no  wheezes. He has no rhonchi. He has no rales.   Abdominal: Normal appearance.   Musculoskeletal: Normal range of motion.         General: No deformity. Normal range of motion.   Lymphadenopathy:     He has no cervical adenopathy.   Neurological: He is alert and oriented to person, place, and time. He has normal sensation. He exhibits normal muscle tone. Gait normal. Coordination normal.   Skin: Skin is warm, dry, intact, not diaphoretic, not pale and no rash. Capillary refill takes less than 2 seconds.   Psychiatric: His speech is normal and behavior is normal. Judgment and thought content normal.   Nursing note and vitals reviewed.    Results for orders placed or performed in visit on 06/07/24   SARS Coronavirus 2 Antigen, POCT Manual Read   Result Value Ref Range    SARS Coronavirus 2 Antigen Negative Negative     Acceptable Yes      XR CHEST PA AND LATERAL    Result Date: 6/7/2024  EXAMINATION: XR CHEST PA AND LATERAL CLINICAL HISTORY: Other fatigue TECHNIQUE: PA and lateral views of the chest were performed. COMPARISON: None FINDINGS: The lungs are clear, with normal appearance of pulmonary vasculature and no pleural effusion or pneumothorax. The cardiac silhouette is normal in size. The hilar and mediastinal contours are unremarkable. Bones are intact.     No acute abnormality. Electronically signed by: Grant Hickman MD Date:    06/07/2024 Time:    11:43  Assessment:     1. Upper respiratory tract infection, unspecified type    2. Fatigue, unspecified type    3. Cough, unspecified type    4. History of pneumonia        Plan:       Upper respiratory tract infection, unspecified type    Fatigue, unspecified type  -     XR CHEST PA AND LATERAL; Future; Expected date: 06/07/2024  -     SARS Coronavirus 2 Antigen, POCT Manual Read    Cough, unspecified type  -     XR CHEST PA AND LATERAL; Future; Expected date: 06/07/2024    History of pneumonia    Other orders  -     predniSONE (DELTASONE) 20 MG  tablet; Take 1 tablet (20 mg total) by mouth once daily. for 3 days  Dispense: 3 tablet; Refill: 0  -     azelastine (ASTELIN) 137 mcg (0.1 %) nasal spray; 1 spray (137 mcg total) by Nasal route 2 (two) times daily.  Dispense: 30 mL; Refill: 0  -     promethazine (PHENERGAN) 6.25 mg/5 mL syrup; Take 10 mLs at night as needed.  Dispense: 120 mL; Refill: 1        Patient Instructions   INSTRUCTIONS:  - Rest.  - Drink plenty of fluids.  - Take Tylenol and/or Ibuprofen as directed as needed for fever/pain.  Do not take more than the recommended dose.  - follow up with your PCP within the next 1-2 weeks as needed.  - You must understand that you have received an Urgent Care treatment only and that you may be released before all of your medical problems are known or treated.   - You, the patient, will arrange for follow up care as instructed.   - If your condition worsens or fails to improve we recommend that you receive another evaluation at the ER immediately or contact your PCP to discuss your concerns.   - You can call [phone] or [phone] to help schedule an appointment with the appropriate provider.     -If you smoke cigarettes, it would be beneficial for you to stop.

## 2024-10-17 DIAGNOSIS — C44.222: Primary | ICD-10-CM

## 2024-10-24 ENCOUNTER — PROCEDURE VISIT (OUTPATIENT)
Dept: DERMATOLOGY | Facility: CLINIC | Age: 79
End: 2024-10-24
Payer: MEDICARE

## 2024-10-24 VITALS
HEART RATE: 59 BPM | HEIGHT: 75 IN | SYSTOLIC BLOOD PRESSURE: 158 MMHG | WEIGHT: 205 LBS | BODY MASS INDEX: 25.49 KG/M2 | DIASTOLIC BLOOD PRESSURE: 77 MMHG

## 2024-10-24 DIAGNOSIS — C44.222: ICD-10-CM

## 2024-10-24 PROCEDURE — 15260 FTH/GFT FR N/E/E/L 20 SQCM/<: CPT | Mod: S$GLB,,, | Performed by: DERMATOLOGY

## 2024-10-24 PROCEDURE — 17311 MOHS 1 STAGE H/N/HF/G: CPT | Mod: 51,S$GLB,, | Performed by: DERMATOLOGY

## 2024-10-24 PROCEDURE — 17312 MOHS ADDL STAGE: CPT | Mod: S$GLB,,, | Performed by: DERMATOLOGY

## 2024-10-24 RX ORDER — DOXYCYCLINE 100 MG/1
100 CAPSULE ORAL 2 TIMES DAILY
Qty: 14 CAPSULE | Refills: 0 | Status: SHIPPED | OUTPATIENT
Start: 2024-10-24 | End: 2024-10-31

## 2024-10-28 ENCOUNTER — TELEPHONE (OUTPATIENT)
Dept: DERMATOLOGY | Facility: CLINIC | Age: 79
End: 2024-10-28
Payer: MEDICARE

## 2024-10-31 ENCOUNTER — CLINICAL SUPPORT (OUTPATIENT)
Dept: DERMATOLOGY | Facility: CLINIC | Age: 79
End: 2024-10-31
Payer: MEDICARE

## 2024-10-31 DIAGNOSIS — Z48.02 VISIT FOR SUTURE REMOVAL: Primary | ICD-10-CM

## 2024-10-31 PROCEDURE — 99999 PR PBB SHADOW E&M-EST. PATIENT-LVL I: CPT | Mod: PBBFAC,,,

## 2024-11-07 ENCOUNTER — TELEPHONE (OUTPATIENT)
Dept: DERMATOLOGY | Facility: CLINIC | Age: 79
End: 2024-11-07
Payer: MEDICARE

## 2024-11-07 NOTE — TELEPHONE ENCOUNTER
Returned patients call. He was concerned about the right neck. Patient stated it was swollen and black. Schedule for a wound check next week.

## 2024-11-12 ENCOUNTER — OFFICE VISIT (OUTPATIENT)
Dept: DERMATOLOGY | Facility: CLINIC | Age: 79
End: 2024-11-12
Payer: MEDICARE

## 2024-11-12 DIAGNOSIS — T14.8XXA HEMATOMA: Primary | ICD-10-CM

## 2024-11-12 DIAGNOSIS — Z51.89 VISIT FOR WOUND CHECK: ICD-10-CM

## 2024-11-12 PROCEDURE — 1157F ADVNC CARE PLAN IN RCRD: CPT | Mod: CPTII,S$GLB,, | Performed by: DERMATOLOGY

## 2024-11-12 PROCEDURE — 99024 POSTOP FOLLOW-UP VISIT: CPT | Mod: S$GLB,,, | Performed by: DERMATOLOGY

## 2024-11-12 NOTE — PROGRESS NOTES
Dermatologic Surgery Wound Check Note  Name: Chico Perez   Date: 2024  Patient : 1945    Attending Surgeon: Sabina Mcallister MD FAAD  Assistants: Ankita Barr - Surgical Technician  Location: right neck    Subjective: Patient presents today for wound check on the right neck suture site. Patient reports no pain, just bothersome.     Objective:  Physical Exam   Head   Head comments: Ear - graft with partial necrosis, healing well     [place] - small hematoma at inferior edge of graft harvesting site             Assessment and plan:    Diagnoses and all orders for this visit:    Hematoma  - small should resolve on its own   - counseled no drainage needed  - counseled it may come to a head and drain black fluid but not expected to do this with such a small hematoma    Visit for wound check  - graft granulating well s/p partial necrosis  - continue ointment and a bandage for now

## 2024-12-03 ENCOUNTER — OFFICE VISIT (OUTPATIENT)
Dept: DERMATOLOGY | Facility: CLINIC | Age: 79
End: 2024-12-03
Payer: MEDICARE

## 2024-12-03 DIAGNOSIS — S01.301S: Primary | ICD-10-CM

## 2024-12-03 PROCEDURE — 99024 POSTOP FOLLOW-UP VISIT: CPT | Mod: S$GLB,,, | Performed by: DERMATOLOGY

## 2024-12-03 PROCEDURE — 1157F ADVNC CARE PLAN IN RCRD: CPT | Mod: CPTII,S$GLB,, | Performed by: DERMATOLOGY

## 2024-12-03 NOTE — PROGRESS NOTES
"Dermatologic Surgery Wound Check Note  Name: Chico Perez   Date: 12/3/2024  Patient : 1945    Attending Surgeon: Sabina Mcallister MD FAAD  Assistants: Deep Otero RN  Location: left ear    Subjective: Patient presents today for wound check on the right ear. Patient reports wound healing well    Objective:  Physical Exam   EENT     Ear comments: Skin graft well healed now and fully reepithelialized    Neck   Neck comments: Neck well healed, small hematoma resolved           No signs and symptoms of infection       Assessment and plan:    Chico Johnson" was seen today for wound check.    Diagnoses and all orders for this visit:    Unspecified open wound of right ear, sequelae  - s/p mohs and skin graft with partial necrosis, now well healed  - right neck donor site well healed, small hematoma noted at previous wound check has since resolved  - follow up prn  - follow up with general derm provider for skin checks  Sabina Mcallister MD           "
independent

## 2024-12-13 PROBLEM — J84.9 INTERSTITIAL PULMONARY DISEASE, UNSPECIFIED: Status: ACTIVE | Noted: 2024-12-13

## 2025-01-01 DIAGNOSIS — G93.9 BRAIN LESION: Primary | ICD-10-CM

## 2025-02-27 ENCOUNTER — PATIENT MESSAGE (OUTPATIENT)
Dept: PAIN MEDICINE | Facility: CLINIC | Age: 80
End: 2025-02-27
Payer: MEDICARE

## 2025-04-25 PROBLEM — G93.9 BRAIN LESION: Status: ACTIVE | Noted: 2025-01-01

## 2025-04-25 PROBLEM — Z71.89 ACP (ADVANCE CARE PLANNING): Status: ACTIVE | Noted: 2025-01-01

## 2025-04-25 PROBLEM — R29.818 FOCAL NEUROLOGICAL DEFICIT: Status: ACTIVE | Noted: 2025-01-01

## 2025-04-26 PROBLEM — I61.9 RIGHT-SIDED NONTRAUMATIC INTRACEREBRAL HEMORRHAGE: Status: ACTIVE | Noted: 2025-01-01

## 2025-04-26 PROBLEM — R06.03 ACUTE RESPIRATORY DISTRESS: Status: ACTIVE | Noted: 2025-01-01

## 2025-04-26 PROBLEM — G93.5 BRAIN COMPRESSION: Status: ACTIVE | Noted: 2025-01-01

## 2025-04-26 PROBLEM — G93.6 CEREBRAL EDEMA: Status: ACTIVE | Noted: 2025-01-01

## 2025-04-28 PROBLEM — G91.1 OBSTRUCTIVE HYDROCEPHALUS: Status: ACTIVE | Noted: 2025-01-01

## 2025-04-28 PROBLEM — Z86.19 HISTORY OF MAC INFECTION: Status: ACTIVE | Noted: 2025-01-01

## 2025-04-28 PROBLEM — R50.9 FEVER: Status: ACTIVE | Noted: 2025-01-01

## 2025-04-28 PROBLEM — R56.9 FOCAL SEIZURE: Status: ACTIVE | Noted: 2025-01-01

## 2025-04-28 PROBLEM — Z99.11 ON MECHANICALLY ASSISTED VENTILATION: Status: ACTIVE | Noted: 2025-01-01

## 2025-04-28 PROBLEM — R53.81 DEBILITY: Status: ACTIVE | Noted: 2025-01-01

## 2025-04-30 PROBLEM — R74.01 TRANSAMINITIS: Status: ACTIVE | Noted: 2025-01-01

## 2025-04-30 PROBLEM — J15.69 ENTEROBACTER CLOACAE PNEUMONIA: Status: ACTIVE | Noted: 2025-01-01

## 2025-05-01 PROBLEM — R50.9 FEVER: Status: RESOLVED | Noted: 2025-01-01 | Resolved: 2025-01-01

## 2025-05-02 PROBLEM — N19 UREMIA: Status: ACTIVE | Noted: 2025-01-01

## 2025-05-04 PROBLEM — E87.5 HYPERKALEMIA: Status: ACTIVE | Noted: 2025-01-01

## 2025-05-06 PROBLEM — Z51.5 COMFORT MEASURES ONLY STATUS: Status: ACTIVE | Noted: 2025-01-01
